# Patient Record
Sex: MALE | Race: WHITE
[De-identification: names, ages, dates, MRNs, and addresses within clinical notes are randomized per-mention and may not be internally consistent; named-entity substitution may affect disease eponyms.]

---

## 2017-01-02 ENCOUNTER — HOSPITAL ENCOUNTER (EMERGENCY)
Dept: HOSPITAL 47 - EC | Age: 47
LOS: 1 days | Discharge: HOME | End: 2017-01-03
Payer: COMMERCIAL

## 2017-01-02 VITALS — RESPIRATION RATE: 16 BRPM

## 2017-01-02 DIAGNOSIS — Z98.62: ICD-10-CM

## 2017-01-02 DIAGNOSIS — K42.9: ICD-10-CM

## 2017-01-02 DIAGNOSIS — N28.1: ICD-10-CM

## 2017-01-02 DIAGNOSIS — R10.9: Primary | ICD-10-CM

## 2017-01-02 DIAGNOSIS — Z79.899: ICD-10-CM

## 2017-01-02 DIAGNOSIS — N43.3: ICD-10-CM

## 2017-01-02 LAB
ALP SERPL-CCNC: 63 U/L (ref 38–126)
ALT SERPL-CCNC: 27 U/L (ref 21–72)
AMYLASE SERPL-CCNC: 72 U/L (ref 30–110)
ANION GAP SERPL CALC-SCNC: 14 MMOL/L
AST SERPL-CCNC: 20 U/L (ref 17–59)
BASOPHILS # BLD AUTO: 0.1 K/UL (ref 0–0.2)
BASOPHILS NFR BLD AUTO: 2 %
BUN SERPL-SCNC: 13 MG/DL (ref 9–20)
CALCIUM SPEC-MCNC: 9.3 MG/DL (ref 8.4–10.2)
CH: 29.2
CHCM: 34
CHLORIDE SERPL-SCNC: 101 MMOL/L (ref 98–107)
CO2 SERPL-SCNC: 29 MMOL/L (ref 22–30)
EOSINOPHIL # BLD AUTO: 0.2 K/UL (ref 0–0.7)
EOSINOPHIL NFR BLD AUTO: 3 %
ERYTHROCYTE [DISTWIDTH] IN BLOOD BY AUTOMATED COUNT: 5.29 M/UL (ref 4.3–5.9)
ERYTHROCYTE [DISTWIDTH] IN BLOOD: 13 % (ref 11.5–15.5)
GLUCOSE SERPL-MCNC: 98 MG/DL (ref 74–99)
HCT VFR BLD AUTO: 45.6 % (ref 39–53)
HDW: 2.76
HGB BLD-MCNC: 15.2 GM/DL (ref 13–17.5)
LUC NFR BLD AUTO: 2 %
LYMPHOCYTES # SPEC AUTO: 2 K/UL (ref 1–4.8)
LYMPHOCYTES NFR SPEC AUTO: 26 %
MCH RBC QN AUTO: 28.7 PG (ref 25–35)
MCHC RBC AUTO-ENTMCNC: 33.4 G/DL (ref 31–37)
MCV RBC AUTO: 86.2 FL (ref 80–100)
MONOCYTES # BLD AUTO: 0.5 K/UL (ref 0–1)
MONOCYTES NFR BLD AUTO: 6 %
NEUTROPHILS # BLD AUTO: 4.7 K/UL (ref 1.3–7.7)
NEUTROPHILS NFR BLD AUTO: 61 %
NON-AFRICAN AMERICAN GFR(MDRD): >60
PARTICLE COUNT: 1413
PH UR: 6 [PH] (ref 5–8)
POTASSIUM SERPL-SCNC: 4 MMOL/L (ref 3.5–5.1)
PROT SERPL-MCNC: 7.2 G/DL (ref 6.3–8.2)
RBC UR QL: 2 /HPF (ref 0–5)
SODIUM SERPL-SCNC: 144 MMOL/L (ref 137–145)
SP GR UR: 1.02 (ref 1–1.03)
SQUAMOUS UR QL AUTO: 2 /HPF (ref 0–4)
UA BILLING (MACRO VS. MICRO): (no result)
UROBILINOGEN UR QL STRIP: <2 MG/DL (ref ?–2)
WBC # BLD AUTO: 0.17 10*3/UL
WBC # BLD AUTO: 7.7 K/UL (ref 3.8–10.6)
WBC #/AREA URNS HPF: 8 /HPF (ref 0–5)
WBC (PEROX): 7.81

## 2017-01-02 PROCEDURE — 82150 ASSAY OF AMYLASE: CPT

## 2017-01-02 PROCEDURE — 74020: CPT

## 2017-01-02 PROCEDURE — 83605 ASSAY OF LACTIC ACID: CPT

## 2017-01-02 PROCEDURE — 87086 URINE CULTURE/COLONY COUNT: CPT

## 2017-01-02 PROCEDURE — 80053 COMPREHEN METABOLIC PANEL: CPT

## 2017-01-02 PROCEDURE — 36415 COLL VENOUS BLD VENIPUNCTURE: CPT

## 2017-01-02 PROCEDURE — 81001 URINALYSIS AUTO W/SCOPE: CPT

## 2017-01-02 PROCEDURE — 99284 EMERGENCY DEPT VISIT MOD MDM: CPT

## 2017-01-02 PROCEDURE — 74177 CT ABD & PELVIS W/CONTRAST: CPT

## 2017-01-02 PROCEDURE — 85025 COMPLETE CBC W/AUTO DIFF WBC: CPT

## 2017-01-02 PROCEDURE — 83690 ASSAY OF LIPASE: CPT

## 2017-01-02 NOTE — ED
General Adult HPI





- General


Source: patient, family, RN notes reviewed, old records reviewed


Mode of arrival: wheelchair


Limitations: no limitations





<Enrrique Bateman - Last Filed: 01/02/17 21:43>





<Simone Del Real - Last Filed: 01/02/17 23:07>





- General


Chief complaint: Urogenital


Stated complaint: Male 


Time Seen by Provider: 01/02/17 20:36





- History of Present Illness


Initial comments: 





Chief complaint history of present illness this is a 46-year-old male here with 

his significant other.  The patient's been having discomfort to the left flank 

left abdomen down into the left scrotal and testicular area.  He recently had 

ultrasounds.  The significant one is already detailed in the medical decision 

making area but does report bilateral hydroceles left being 8.6 cm with 

internal debris and a prominent right varicocele





On examination the patient exquisitely tender with even mild palpation of the 

left abdomen left flank.  Becomes diaphoretic.  Patient states he thinks he may 

have diverticulitis in the past. (Enrrique Bateman)





- Related Data


 Home Medications











 Medication  Instructions  Recorded  Confirmed


 


SUMAtriptan SUCCINATE [Imitrex] 25 mg PO BID PRN 12/03/15 01/02/17








 Previous Rx's











 Medication  Instructions  Recorded


 


Tamsulosin [Flomax] 0.4 mg PO DAILY #3 cap 07/25/16











 Allergies











Allergy/AdvReac Type Severity Reaction Status Date / Time


 


No Known Allergies Allergy   Verified 01/02/17 21:19














Review of Systems


ROS Other: All systems not noted in ROS Statement are negative.





<Enrrique Bateman - Last Filed: 01/02/17 21:43>


ROS Other: All systems not noted in ROS Statement are negative.





<Simone Del Real - Last Filed: 01/02/17 23:07>


ROS Statement: 


Those systems with pertinent positive or pertinent negative responses have been 

documented in the HPI.


Review of systems.  No headache or visual acuity changes.  He has on-again off-

again sweating because of the pain on the left side of the abdomen.  Nausea no 

vomiting.  Decreased appetite.  Has appointment see his general surgeon shortly 

but the pain prevented him from waiting that long.  All systems are reviewed.  

Past medical problems angina, pneumonia, kidney stones, diverticulosis, 

degenerative disc disease, urethral stricture.  The patient surgical back 

surgery, urethral stricture surgery cholecystectomy, heart catheterization 

butstents.  Hernia repair, left side.  And cardiac catheterization as noted 

above.  Family history kidney stones.  Patient has no ALLERGIES.  Nonsmoker 

nondrinker.





 (Enrrique Bateman)


 (Simone Del Real)





Past Medical History


Past Medical History: Chest Pain / Angina, Musculoskeletal Disorder, Pneumonia, 

Renal Disease


Additional Past Medical History / Comment(s): divericulosis, DDD, back pain and 

some neck pain on occasion, damage to uretha from catheter insertion during 

cystoscopy then developed urethral stricture- if pt notices urinary stream 

getting smaller he has a dilating device he uses but has not had to do this in 

yrs, kidney stones that he passed, carpal tunnel bilaterally, migraines, L 

fractured arm 2008. pancreatitis in may 2015


History of Any Multi-Drug Resistant Organisms: None Reported


Past Surgical History: Back Surgery, Cholecystectomy, Heart Catheterization, 

Hernia Repair, Orthopedic Surgery


Additional Past Surgical History / Comment(s): Cardiac cath normal,  cystoscopy

, R and L inguinal hernia repairs.  C5-6 discectomy with fusion and  titanium 

plate, L shoulder rotaor cuff repair acromioplasty and distal clavicle removed.

  Numerous colonoscopies,


Past Anesthesia/Blood Transfusion Reactions: No Reported Reaction


Additional Past Anesthesia/Blood Transfusion Reaction / Comment(s): Pt has 

never recieved blood.


Past Psychological History: No Psychological Hx Reported


Additional Psychological History / Comment(s): Pt lives at home with 

significant other.  He is independent. No assistive devices.  No home care.


Smoking Status: Never smoker


Past Alcohol Use History: None Reported


Additional Past Alcohol Use History / Comment(s): smoked as teenager in high 

school none since.


Past Drug Use History: None Reported





- Past Family History


  ** Father


Family Medical History: Diabetes Mellitus, Hypertension





  ** Mother


Family Medical History: Cancer


Additional Family Medical History / Comment(s): Mother had a total hysterectomy 

due to CA





<Enrrique Bateman - Last Filed: 01/02/17 21:43>





General Exam


Limitations: no limitations





<Enrrique Bateman - Last Filed: 01/02/17 21:43>


General appearance: alert, in no apparent distress


Head exam: Present: atraumatic, normocephalic, normal inspection


Eye exam: Present: normal appearance, PERRL, EOMI.  Absent: scleral icterus, 

conjunctival injection, periorbital swelling


ENT exam: Present: normal exam, mucous membranes moist


Neck exam: Present: normal inspection.  Absent: tenderness, meningismus, 

lymphadenopathy


Respiratory exam: Present: normal lung sounds bilaterally.  Absent: respiratory 

distress, wheezes, rales, rhonchi, stridor


Cardiovascular Exam: Present: regular rate, normal rhythm, normal heart sounds.

  Absent: systolic murmur, diastolic murmur, rubs, gallop, clicks


GI/Abdominal exam: Present: soft, normal bowel sounds.  Absent: distended, 

tenderness, guarding, rebound, rigid


Extremities exam: Present: normal inspection, full ROM, normal capillary 

refill.  Absent: tenderness, pedal edema, joint swelling, calf tenderness


Back exam: Present: normal inspection


Neurological exam: Present: alert, oriented X3, CN II-XII intact


Psychiatric exam: Present: normal affect, normal mood


Skin exam: Present: warm, dry, intact, normal color.  Absent: rash





<Simone Del Real - Last Filed: 01/02/17 23:07>





- General Exam Comments


Initial Comments: 





General:


The patient is awake and alert, because diaphoretic with palpation of the left 

side of the abdomen.  Ongoing for several days.  Vital signs temp 97.0 pulse 90 

respiratory rate 18 pulse ox 99% room air


Eye:


Pupils are equal,  , extra-ocular movements are intact; there is normal 

conjunctiva bilaterally. No signs of icterus. 


Ears, nose, mouth and throat:


There are moist mucous membranes  


Neck:


The neck is supple, there is no tenderness .. 


Cardiovascular:


There is a regular rate and rhythm. No murmur, rub or gallop is appreciated.


Respiratory:


Lungs are clear to auscultation, respirations are non-labored, breath sounds 

are equal. No wheezes, stridor, rales, or rhonchi.


Gastrointestinal:


The patient has voluntary guarding and becomes diaphoretic with even mild 

palpation of the left side the abdomen.  Deep palpation on the right side 

causes no pain minimal to no rebound or referred pain to the left side.  

Examination of the scrotum finds significant hydroceles bilaterally currently 

the left is greater than the right.  Tenderness with manipulation even 

significant was a tenderness with even mild palpation over the left inguinal 

region.


Back:


There is no tenderness to palpation in the midline. There is no obvious 

deformity. No rashes noted. 


Musculoskeletal:


Normal ROM, no tenderness, There is no pedal edema. There is no calf tenderness 

or swelling. Sensation intact. Pulses equal bilaterally 2+. 


Neurological:


No neuro deficits


Skin:


Skin is warm and dry and no rashes or lesions are noted. 


  (Enrrique Bateman)





Course





<Enrrique Bateman - Last Filed: 01/02/17 21:43>





<Simone Del Real - Last Filed: 01/02/17 23:07>





 Vital Signs











  01/02/17 01/02/17





  20:15 22:47


 


Temperature 98.0 F 


 


Pulse Rate 97 64


 


Respiratory 18 16





Rate  


 


Blood Pressure  114/64


 


O2 Sat by Pulse 99 99





Oximetry  








 (Enrrique Bateman)


 (Simone Del Real)





- Reevaluation(s)


Reevaluation #1: 





01/02/17 23:06


Patient's symptoms at this time are improved, no specific abdominal pain (

Simone Del Real)


Reevaluation #2: 





01/02/17 23:06


Patient counseled regarding CT findings, hernia, will follow up with surgery as 

appropriate (Simone Del Real)





Medical Decision Making





- Lab Data


Result diagrams: 


 01/02/17 21:10





 01/02/17 21:10





<Enrrique Bateman - Last Filed: 01/02/17 21:43>





- Lab Data


Result diagrams: 


 01/02/17 21:10





 01/02/17 21:10





- Radiology Data


Radiology results: report reviewed (X-ray abdomen, CT and pelvis does show fat-

containing hernia, no acute disease), image reviewed





<Simone Del Real - Last Filed: 01/02/17 23:07>





- Medical Decision Making





Patient had several ultrasounds and tests prior to coming in today.  All 

related to the discomfort in the left side and left testicle area.  The patient'

s ultrasound performed 12 days ago showed good bilateral color flow and 

waveforms are seen is no evidence of testicular torsion.  There are presence of 

hydroceles on the right side a 6 cm with internal debris on the left 8.6 cm 

with internal debris.  There is also presence of varicoceles on the right with 

prominent vessels.  The right testicle increase in vascularity with Valsalva.  

As read by Dr. Kellogg





the patient also had an ultrasound of the abdomen complete in the pelvis 

limited.  The radiologist's impression is the pancreas poorly visualized.  The 

liver visualized portions are within normal limits limited by rib shadowing.  

The gallbladder is surgically absent.  Common bile duct visualized portions 

normal limits, limited distally overlying


Gas.  The spleen visualized portions are normal limits, limited by rib 

shadowing overlying gas.  The right kidney 1.6 cm hypoechoic area superior pole

, 0.5 cm echogenic shadowing focus superior pole.  There is 1.6 cm simple 

appearing cyst in the upper pole right kidney.  There is very questionable 4.6 

mm calcification right hip.  Left kidney within normal limits.  Aorta 

visualized portions normal limits proximal


Midline gas.  Upper IVC, within normal limits.  The bladder wall appears 

slightly thickened 0.4 cm.  Bladder is not distended however final impression 

is 1 simple appearing right upper pole renal cyst.  No acute questionable 5 mm 

calcification in the right.  As read by Dr. Kellogg ([facility])





- Lab Data





 Lab Results











  01/02/17 01/02/17 01/02/17 Range/Units





  21:10 21:10 21:10 


 


WBC   7.7   (3.8-10.6)  k/uL


 


RBC   5.29   (4.30-5.90)  m/uL


 


Hgb   15.2   (13.0-17.5)  gm/dL


 


Hct   45.6   (39.0-53.0)  %


 


MCV   86.2   (80.0-100.0)  fL


 


MCH   28.7   (25.0-35.0)  pg


 


MCHC   33.4   (31.0-37.0)  g/dL


 


RDW   13.0   (11.5-15.5)  %


 


Plt Count   252   (150-450)  k/uL


 


Neutrophils %   61   %


 


Lymphocytes %   26   %


 


Monocytes %   6   %


 


Eosinophils %   3   %


 


Basophils %   2   %


 


Neutrophils #   4.7   (1.3-7.7)  k/uL


 


Lymphocytes #   2.0   (1.0-4.8)  k/uL


 


Monocytes #   0.5   (0-1.0)  k/uL


 


Eosinophils #   0.2   (0-0.7)  k/uL


 


Basophils #   0.1   (0-0.2)  k/uL


 


Sodium  144    (137-145)  mmol/L


 


Potassium  4.0    (3.5-5.1)  mmol/L


 


Chloride  101    ()  mmol/L


 


Carbon Dioxide  29    (22-30)  mmol/L


 


Anion Gap  14    mmol/L


 


BUN  13    (9-20)  mg/dL


 


Creatinine  0.86    (0.66-1.25)  mg/dL


 


Est GFR (MDRD) Af Amer  >60    (>60 ml/min/1.73 sqM)  


 


Est GFR (MDRD) Non-Af  >60    (>60 ml/min/1.73 sqM)  


 


Glucose  98    (74-99)  mg/dL


 


Plasma Lactic Acid Jones    2.0  (0.7-2.0)  mmol/L


 


Calcium  9.3    (8.4-10.2)  mg/dL


 


Total Bilirubin  0.5    (0.2-1.3)  mg/dL


 


AST  20    (17-59)  U/L


 


ALT  27    (21-72)  U/L


 


Alkaline Phosphatase  63    ()  U/L


 


Total Protein  7.2    (6.3-8.2)  g/dL


 


Albumin  4.1    (3.5-5.0)  g/dL


 


Amylase  72    ()  U/L


 


Lipase  192    ()  U/L


 


Urine Color     


 


Urine Appearance     (Clear)  


 


Urine pH     (5.0-8.0)  


 


Ur Specific Gravity     (1.001-1.035)  


 


Urine Protein     (Negative)  


 


Urine Glucose (UA)     (Negative)  


 


Urine Ketones     (Negative)  


 


Urine Blood     (Negative)  


 


Urine Nitrate     (Negative)  


 


Urine Bilirubin     (Negative)  


 


Urine Urobilinogen     (<2.0)  mg/dL


 


Ur Leukocyte Esterase     (Negative)  


 


Urine RBC     (0-5)  /hpf


 


Urine WBC     (0-5)  /hpf


 


Ur Squamous Epith Cells     (0-4)  /hpf


 


Calcium Oxalate Crystal     (None)  /hpf


 


Urine Bacteria     (None)  /hpf


 


Urine Mucus     (None)  /hpf














  01/02/17 Range/Units





  21:20 


 


WBC   (3.8-10.6)  k/uL


 


RBC   (4.30-5.90)  m/uL


 


Hgb   (13.0-17.5)  gm/dL


 


Hct   (39.0-53.0)  %


 


MCV   (80.0-100.0)  fL


 


MCH   (25.0-35.0)  pg


 


MCHC   (31.0-37.0)  g/dL


 


RDW   (11.5-15.5)  %


 


Plt Count   (150-450)  k/uL


 


Neutrophils %   %


 


Lymphocytes %   %


 


Monocytes %   %


 


Eosinophils %   %


 


Basophils %   %


 


Neutrophils #   (1.3-7.7)  k/uL


 


Lymphocytes #   (1.0-4.8)  k/uL


 


Monocytes #   (0-1.0)  k/uL


 


Eosinophils #   (0-0.7)  k/uL


 


Basophils #   (0-0.2)  k/uL


 


Sodium   (137-145)  mmol/L


 


Potassium   (3.5-5.1)  mmol/L


 


Chloride   ()  mmol/L


 


Carbon Dioxide   (22-30)  mmol/L


 


Anion Gap   mmol/L


 


BUN   (9-20)  mg/dL


 


Creatinine   (0.66-1.25)  mg/dL


 


Est GFR (MDRD) Af Amer   (>60 ml/min/1.73 sqM)  


 


Est GFR (MDRD) Non-Af   (>60 ml/min/1.73 sqM)  


 


Glucose   (74-99)  mg/dL


 


Plasma Lactic Acid Jones   (0.7-2.0)  mmol/L


 


Calcium   (8.4-10.2)  mg/dL


 


Total Bilirubin   (0.2-1.3)  mg/dL


 


AST   (17-59)  U/L


 


ALT   (21-72)  U/L


 


Alkaline Phosphatase   ()  U/L


 


Total Protein   (6.3-8.2)  g/dL


 


Albumin   (3.5-5.0)  g/dL


 


Amylase   ()  U/L


 


Lipase   ()  U/L


 


Urine Color  Yellow  


 


Urine Appearance  Clear  (Clear)  


 


Urine pH  6.0  (5.0-8.0)  


 


Ur Specific Gravity  1.017  (1.001-1.035)  


 


Urine Protein  Trace H  (Negative)  


 


Urine Glucose (UA)  Negative  (Negative)  


 


Urine Ketones  Negative  (Negative)  


 


Urine Blood  Negative  (Negative)  


 


Urine Nitrate  Negative  (Negative)  


 


Urine Bilirubin  Negative  (Negative)  


 


Urine Urobilinogen  <2.0  (<2.0)  mg/dL


 


Ur Leukocyte Esterase  Trace H  (Negative)  


 


Urine RBC  2  (0-5)  /hpf


 


Urine WBC  8 H  (0-5)  /hpf


 


Ur Squamous Epith Cells  2  (0-4)  /hpf


 


Calcium Oxalate Crystal  Rare H  (None)  /hpf


 


Urine Bacteria  Rare H  (None)  /hpf


 


Urine Mucus  Rare H  (None)  /hpf








 (Enrrique Bateman)


 (Simone Del Real)





Disposition





<Enrriqeu Bateman - Last Filed: 01/02/17 21:43>





<Simone Del Real - Last Filed: 01/02/17 23:07>


Clinical Impression: 


 Abdominal pain, Umbilical hernia





Disposition: HOME SELF-CARE


Condition: Good


Instructions:  Abdominal Pain (ED), Umbilical Hernia (ED)


Referrals: 


AMAYA Newton III, MD [Primary Care Provider] - 1-2 days


Hilda Shaw MD [STAFF PHYSICIAN] - 1-2 days

## 2017-01-02 NOTE — CT
EXAMINATION TYPE: CT abdomen pelvis w con

 

DATE OF EXAM: 1/2/2017 10:36 PM

 

COMPARISON: 7/25/2016

 

HISTORY: Generalized abdominal pain and left testicular pain with discoloration to the scrotum, histo
ry of diverticulitis, and cholecystectomy

 

CT DLP: 2496.60 mGycm

Automated exposure control for dose reduction was used.

 

TECHNIQUE:  Helical acquisition of images was performed from the lung bases through the pelvis.

 

CONTRAST: 

Performed without Oral Contrast and with IV Contrast, patient injected with 100 mL of Omnipaque 300.

 

FINDINGS: 

 

LUNG BASES: Mild atelectasis/scarring is suggested in the right lung base anteriorly.

 

LIVER/GB: No significant abnormality is appreciated in the liver. Cholecystectomy changes are noted.

 

PANCREAS: No significant abnormality is seen.

 

SPLEEN: No significant abnormality is seen.

 

ADRENALS: No significant abnormality is seen.

 

KIDNEYS: Benign-appearing bilateral renal cysts are noted.

Mild perinephric fat stranding is noted bilaterally more on the left side probably of chronic nature.
 There is suggestion of tiny nonobstructing opaque stones of 2 to 3 mm in both kidneys.

 

REPRODUCTIVE ORGANS: Mild enlargement of prostate gland is noted.

 

URINARY BLADDER:  No significant abnormality is seen.

 

PELVIC ADENOPATHY:  None visualized.

 

OSSEOUS STRUCTURES:  Multilevel degenerative changes are present in the thoracolumbar spine with mult
iple old wedge compression deformities of thoracic vertebrae.

 

BOWEL:  Visualized appendix appears grossly unremarkable without significant surrounding inflammation
.

Stomach appears grossly unremarkable. Mild fluid distention of small bowel loops are noted without si
gnificant obstruction.

Mild colonic diverticulosis is noted. There is mucosal thickening in the sigmoid colon and descending
 colon and possibility of mild colitis changes cannot be excluded.

 

ABDOMINAL WALL: There is evidence of moderate fact and probable omentum containing inguinal hernias b
ilaterally larger on the left side. No bowel herniation is noted.

Visualized left testicle appears grossly unremarkable in this limited evaluation. Only a small portio
n of left testicle is included in the axial images. The right testicle is not included in the axial i
mages.

 

IMPRESSION: 

 

1. MODERATE FACT AND PROBABLE OMENTUM CONTAINING INGUINAL HERNIAS BILATERALLY LARGER ON THE LEFT SIDE
.

2. VISUALIZED LEFT TESTICLE APPEARS GROSSLY UNREMARKABLE IN THIS LIMITED EVALUATION.

3. BILATERAL RENAL CYSTS AND NONOBSTRUCTING OPAQUE STONES.

4. CHOLECYSTECTOMY. 

5. VISUALIZED APPENDIX APPEARS UNREMARKABLE.

## 2017-01-03 VITALS — HEART RATE: 67 BPM | SYSTOLIC BLOOD PRESSURE: 107 MMHG | TEMPERATURE: 97.9 F | DIASTOLIC BLOOD PRESSURE: 78 MMHG

## 2017-04-03 ENCOUNTER — HOSPITAL ENCOUNTER (EMERGENCY)
Dept: HOSPITAL 47 - EC | Age: 47
Discharge: HOME | End: 2017-04-03
Payer: COMMERCIAL

## 2017-04-03 VITALS
RESPIRATION RATE: 20 BRPM | HEART RATE: 95 BPM | DIASTOLIC BLOOD PRESSURE: 66 MMHG | TEMPERATURE: 97.9 F | SYSTOLIC BLOOD PRESSURE: 115 MMHG

## 2017-04-03 DIAGNOSIS — S43.004A: Primary | ICD-10-CM
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
## 2017-04-03 PROCEDURE — 23650 CLTX SHO DSLC W/MNPJ WO ANES: CPT

## 2017-04-03 PROCEDURE — 73020 X-RAY EXAM OF SHOULDER: CPT

## 2017-04-03 PROCEDURE — 99284 EMERGENCY DEPT VISIT MOD MDM: CPT

## 2017-04-03 PROCEDURE — 96374 THER/PROPH/DIAG INJ IV PUSH: CPT

## 2017-04-03 PROCEDURE — 99152 MOD SED SAME PHYS/QHP 5/>YRS: CPT

## 2017-04-03 NOTE — XR
EXAMINATION TYPE: XR shoulder limited RT

 

DATE OF EXAM: 4/3/2017 6:19 PM

 

COMPARISON: NONE

 

HISTORY: Fell today

 

TECHNIQUE: 2 views

 

FINDINGS: There is anterior dislocation of the humeral head. Exam is limited by the obesity. I see no
 fracture line.

 

IMPRESSION: Anterior shoulder joint dislocation.

## 2017-04-03 NOTE — XR
EXAMINATION TYPE: XR shoulder limited RT

 

DATE OF EXAM: 4/3/2017 6:55 PM

 

COMPARISON: Today

 

HISTORY: Post reduction

 

TECHNIQUE: Single view

 

FINDINGS: There appears to be anatomic reduction of the glenohumeral joint. I see no fracture line.

 

IMPRESSION: Anatomic reduction.

## 2017-04-03 NOTE — ED
General Adult HPI





- General


Chief complaint: Extremity Injury, Upper


Stated complaint: FALL


Time Seen by Provider: 04/03/17 17:45


Source: patient, EMS, RN notes reviewed, old records reviewed


Mode of arrival: EMS


Limitations: no limitations





- History of Present Illness


Initial comments: 





This is a 46-year-old male to the ER for evaluation.  This patient closely for 

evaluation of shoulder pain.  Patient has been falling and right shoulder, 

severe right shoulder pain.  Patient's fall was mechanical in nature has no 

other injury aside from the right shoulder





- Related Data


 Home Medications











 Medication  Instructions  Recorded  Confirmed


 


SUMAtriptan SUCCINATE [Imitrex] 25 mg PO BID PRN 12/03/15 01/02/17








 Previous Rx's











 Medication  Instructions  Recorded


 


Tamsulosin [Flomax] 0.4 mg PO DAILY #3 cap 07/25/16











 Allergies











Allergy/AdvReac Type Severity Reaction Status Date / Time


 


No Known Allergies Allergy   Verified 04/03/17 17:48














Review of Systems


ROS Statement: 


Those systems with pertinent positive or pertinent negative responses have been 

documented in the HPI.





ROS Other: All systems not noted in ROS Statement are negative.





Past Medical History


Past Medical History: Chest Pain / Angina, Musculoskeletal Disorder, Pneumonia, 

Renal Disease


Additional Past Medical History / Comment(s): divericulosis, DDD, back pain and 

some neck pain on occasion, damage to uretha from catheter insertion during 

cystoscopy then developed urethral stricture- if pt notices urinary stream 

getting smaller he has a dilating device he uses but has not had to do this in 

yrs, kidney stones that he passed, carpal tunnel bilaterally, migraines, L 

fractured arm 2008. pancreatitis in may 2015


History of Any Multi-Drug Resistant Organisms: None Reported


Past Surgical History: Back Surgery, Cholecystectomy, Heart Catheterization, 

Hernia Repair, Orthopedic Surgery


Additional Past Surgical History / Comment(s): Cardiac cath normal,  cystoscopy

, R and L inguinal hernia repairs.  C5-6 discectomy with fusion and  titanium 

plate, L shoulder rotaor cuff repair acromioplasty and distal clavicle removed.

  Numerous colonoscopies,


Past Anesthesia/Blood Transfusion Reactions: No Reported Reaction


Additional Past Anesthesia/Blood Transfusion Reaction / Comment(s): Pt has 

never recieved blood.


Past Psychological History: No Psychological Hx Reported


Additional Psychological History / Comment(s): Pt lives at home with 

significant other.  He is independent. No assistive devices.  No home care.


Smoking Status: Never smoker


Past Alcohol Use History: None Reported


Additional Past Alcohol Use History / Comment(s): smoked as teenager in high 

school none since.


Past Drug Use History: None Reported





- Past Family History


  ** Father


Family Medical History: Diabetes Mellitus, Hypertension





  ** Mother


Family Medical History: Cancer


Additional Family Medical History / Comment(s): Mother had a total hysterectomy 

due to CA





General Exam


Limitations: no limitations


General appearance: alert, in no apparent distress


Head exam: Present: atraumatic, normocephalic, normal inspection


Eye exam: Present: normal appearance, PERRL, EOMI.  Absent: scleral icterus, 

conjunctival injection, periorbital swelling


ENT exam: Present: normal exam, mucous membranes moist


Neck exam: Present: normal inspection.  Absent: tenderness, meningismus, 

lymphadenopathy


Respiratory exam: Present: normal lung sounds bilaterally.  Absent: respiratory 

distress, wheezes, rales, rhonchi, stridor


Cardiovascular Exam: Present: regular rate, normal rhythm, normal heart sounds.

  Absent: systolic murmur, diastolic murmur, rubs, gallop, clicks


GI/Abdominal exam: Present: soft, normal bowel sounds.  Absent: distended, 

tenderness, guarding, rebound, rigid


Extremities exam: Present: normal inspection, full ROM, normal capillary 

refill.  Absent: tenderness, pedal edema, joint swelling, calf tenderness


Back exam: Present: normal inspection


Neurological exam: Present: alert, oriented X3, CN II-XII intact


Psychiatric exam: Present: normal affect, normal mood


Skin exam: Present: warm, dry, intact, normal color.  Absent: rash





Course


 Vital Signs











  04/03/17 04/03/17 04/03/17





  17:48 18:38 18:43


 


Temperature 97.6 F  


 


Pulse Rate 60 74 72


 


Respiratory 18 16 16





Rate   


 


Blood Pressure 105/62 104/59 104/59


 


O2 Sat by Pulse 100 98 100





Oximetry   














  04/03/17





  18:45


 


Temperature 


 


Pulse Rate 72


 


Respiratory 15





Rate 


 


Blood Pressure 158/72


 


O2 Sat by Pulse 100





Oximetry 














- Reevaluation(s)


Reevaluation #1: 





04/03/17 18:55


After procedural sedation patient has not returned to being completely awake 

and alert





Procedures





- Orthopedic Joint Reduction


  ** Joint #1


Consent Obtained: verbal consent


Time Out Performed: Yes


Side: right


Joint Reduction Location: shoulder


Analgesia: procedural sedation


Shoulder Technique Used (if applicable): traction/counter-traction, external 

rotation


Post-Reduction Neuro Exam: intact


Post-Reduction Vascular Exam: intact


Post Reduction X-Ray Obtained: Yes


Post Reduction X-Ray Results: reduced


Splint Applied: Yes


Patient Tolerated Procedure: well





- Procedural Sedation


Indications: fracture/dislocation reduction


ASA Class: II


Mallampati Airway Score: 2


Preparation: cardiac monitor applied, pulse oximeter, capnometry used, 

supplemental O2 applied, reversal agents at bedside, suction/airway equipment 

at bedside


Midazolam: IV


Midazolam Dose: 2


Ketamine: IV


Ketamine Dose: 100


Complications: none


Interventions: oxygen applied


Patient Tolerated Procedure: well





Medical Decision Making





- Medical Decision Making





46 year status post fall, positive right shoulder dislocation, patient was 

placed in the conscious sedation here in the emergency room, her shoulder was 

relocated and patient was discharged home





- Radiology Data


Radiology results: report reviewed (X-ray right shoulder shows anterior 

dislocation, repeat x-ray shows relocation of reduced right shoulder joint), 

image reviewed





Disposition


Clinical Impression: 


 Dislocation of shoulder region, Dislocation of right shoulder joint, Fall





Disposition: HOME SELF-CARE


Condition: Good


Referrals: 


AMAYA Newton III, MD [Primary Care Provider] - 1-2 days

## 2017-05-24 ENCOUNTER — HOSPITAL ENCOUNTER (OUTPATIENT)
Dept: HOSPITAL 47 - RADMRIMAIN | Age: 47
Discharge: HOME | End: 2017-05-24
Payer: COMMERCIAL

## 2017-05-24 DIAGNOSIS — M25.411: Primary | ICD-10-CM

## 2017-05-24 NOTE — MR
EXAMINATION TYPE: MR shoulder RT wo con

 

DATE OF EXAM: 5/24/2017 12:21 PM

 

COMPARISON: NONE

 

HISTORY: Pain in Right shoulder, Dislocation

 

TECHNIQUE: Multiplanar, multisequence imaging of the right shoulder is performed without contrast. Th
e patient was claustrophobic and could not complete the exam. No sagittal images are provided.

 

FINDINGS: Limited scanning demonstrates increased fluid surrounding the bicipital tendon which appear
s dislocated from the bicipital groove.

 

There is a small joint effusion. A complete tear of the rotator cuff tendon with retraction to the AC
 joint is suspected. Hypertrophic change of the AC joint is noted.

 

Due to motion and lack of complete imaging evaluation the labrum is suboptimal. Evaluation the inferi
or glenohumeral ligament is nondiagnostic. Could not exclude injury.

 

 

IMPRESSION: 

Severely limited exam due motion artifact and lack of sagittal images. The exam is incomplete seconda
ry to claustrophobia. Complete tear of the rotator cuff  is suspected with elevation of the humeral h
ead and joint effusion and tendinous retraction.

 

Bicipital tendon appears to be dislocated from the bicipital groove. Incompletely imaged cannot exclu
de biceps bicipital tendon tear based on the limited images provided.

 

AC joint arthropathy.

## 2017-06-03 ENCOUNTER — HOSPITAL ENCOUNTER (EMERGENCY)
Dept: HOSPITAL 47 - EC | Age: 47
End: 2017-06-03
Payer: COMMERCIAL

## 2017-06-03 VITALS — TEMPERATURE: 99.1 F | DIASTOLIC BLOOD PRESSURE: 67 MMHG | HEART RATE: 78 BPM | SYSTOLIC BLOOD PRESSURE: 103 MMHG

## 2017-06-03 VITALS — RESPIRATION RATE: 16 BRPM

## 2017-06-03 DIAGNOSIS — J18.9: Primary | ICD-10-CM

## 2017-06-03 DIAGNOSIS — R11.2: ICD-10-CM

## 2017-06-03 DIAGNOSIS — R19.7: ICD-10-CM

## 2017-06-03 DIAGNOSIS — Z87.891: ICD-10-CM

## 2017-06-03 LAB
ALP SERPL-CCNC: 50 U/L (ref 38–126)
ALT SERPL-CCNC: 63 U/L (ref 21–72)
ANION GAP SERPL CALC-SCNC: 12 MMOL/L
AST SERPL-CCNC: 221 U/L (ref 17–59)
BASOPHILS # BLD AUTO: 0.1 K/UL (ref 0–0.2)
BASOPHILS NFR BLD AUTO: 1 %
BUN SERPL-SCNC: 40 MG/DL (ref 9–20)
CALCIUM SPEC-MCNC: 8.4 MG/DL (ref 8.4–10.2)
CH: 29
CHCM: 35.7
CHLORIDE SERPL-SCNC: 95 MMOL/L (ref 98–107)
CO2 SERPL-SCNC: 25 MMOL/L (ref 22–30)
EOSINOPHIL # BLD AUTO: 0 K/UL (ref 0–0.7)
EOSINOPHIL NFR BLD AUTO: 0 %
ERYTHROCYTE [DISTWIDTH] IN BLOOD BY AUTOMATED COUNT: 5.11 M/UL (ref 4.3–5.9)
ERYTHROCYTE [DISTWIDTH] IN BLOOD: 13.8 % (ref 11.5–15.5)
GLUCOSE SERPL-MCNC: 96 MG/DL (ref 74–99)
HCT VFR BLD AUTO: 41.7 % (ref 39–53)
HDW: 2.98
HGB BLD-MCNC: 14.5 GM/DL (ref 13–17.5)
LUC NFR BLD AUTO: 3 %
LYMPHOCYTES # SPEC AUTO: 0.8 K/UL (ref 1–4.8)
LYMPHOCYTES NFR SPEC AUTO: 11 %
MAGNESIUM SPEC-SCNC: 2.7 MG/DL (ref 1.6–2.3)
MCH RBC QN AUTO: 28.5 PG (ref 25–35)
MCHC RBC AUTO-ENTMCNC: 34.9 G/DL (ref 31–37)
MCV RBC AUTO: 81.6 FL (ref 80–100)
MONOCYTES # BLD AUTO: 0.3 K/UL (ref 0–1)
MONOCYTES NFR BLD AUTO: 5 %
NEUTROPHILS # BLD AUTO: 5.7 K/UL (ref 1.3–7.7)
NEUTROPHILS NFR BLD AUTO: 81 %
NON-AFRICAN AMERICAN GFR(MDRD): 53
PHOSPHATE SERPL-MCNC: 4 MG/DL (ref 2.5–4.5)
POTASSIUM SERPL-SCNC: 3.3 MMOL/L (ref 3.5–5.1)
PROT SERPL-MCNC: 6.7 G/DL (ref 6.3–8.2)
SODIUM SERPL-SCNC: 132 MMOL/L (ref 137–145)
TROPONIN I SERPL-MCNC: 0.02 NG/ML (ref 0–0.03)
WBC # BLD AUTO: 0.2 10*3/UL
WBC # BLD AUTO: 7 K/UL (ref 3.8–10.6)
WBC (PEROX): 6.68

## 2017-06-03 PROCEDURE — 99284 EMERGENCY DEPT VISIT MOD MDM: CPT

## 2017-06-03 PROCEDURE — 74022 RADEX COMPL AQT ABD SERIES: CPT

## 2017-06-03 PROCEDURE — 84100 ASSAY OF PHOSPHORUS: CPT

## 2017-06-03 PROCEDURE — 36415 COLL VENOUS BLD VENIPUNCTURE: CPT

## 2017-06-03 PROCEDURE — 93005 ELECTROCARDIOGRAM TRACING: CPT

## 2017-06-03 PROCEDURE — 85025 COMPLETE CBC W/AUTO DIFF WBC: CPT

## 2017-06-03 PROCEDURE — 82553 CREATINE MB FRACTION: CPT

## 2017-06-03 PROCEDURE — 83735 ASSAY OF MAGNESIUM: CPT

## 2017-06-03 PROCEDURE — 80053 COMPREHEN METABOLIC PANEL: CPT

## 2017-06-03 PROCEDURE — 84484 ASSAY OF TROPONIN QUANT: CPT

## 2017-06-03 PROCEDURE — 96361 HYDRATE IV INFUSION ADD-ON: CPT

## 2017-06-03 PROCEDURE — 96375 TX/PRO/DX INJ NEW DRUG ADDON: CPT

## 2017-06-03 PROCEDURE — 96374 THER/PROPH/DIAG INJ IV PUSH: CPT

## 2017-06-03 PROCEDURE — 83690 ASSAY OF LIPASE: CPT

## 2017-06-03 PROCEDURE — 82550 ASSAY OF CK (CPK): CPT

## 2017-06-03 NOTE — XR
EXAMINATION TYPE: XR abdomen acute w cxr

 

DATE OF EXAM: 6/3/2017

 

COMPARISON: NONE

 

HISTORY: Pain

 

TECHNIQUE: Single view of the chest and 2 views of the abdomen are submitted. 

 

FINDINGS:  

Single view of the chest demonstrates right lower lobe airspace consolidation. 

 

There is no evidence for pneumoperitoneum.  

 

The bowel gas pattern is unremarkable as there is air throughout nondilated small and large bowel.  

 

No sizeable air fluid levels.No mass effects are seen.  

 

No unusual calcifications.  

 

IMPRESSION: 

 

1.  Right lower lobe infiltrate. Correlate for pneumonia.

2. Unremarkable abdomen. Cholecystectomy clips.

## 2017-06-03 NOTE — ED
General Adult HPI





- General


Chief complaint: Nausea/Vomiting/Diarrhea


Stated complaint: NVD *6days


Time Seen by Provider: 06/03/17 07:21


Source: patient, RN notes reviewed, old records reviewed


Mode of arrival: ambulatory


Limitations: no limitations





- History of Present Illness


Initial comments: 





A 39-year-old male the ER for evaluation of multiple complaints, complaints 

vomiting and cold symptoms, cough congestion diarrhea abdominal pain.  Nausea 

and vomiting.  Medical history is significant for multiple surgeries and, no 

history of heart disease.  Nonsmoker.  Patient states family members have also 

been getting sick that he has got at the worse.  No change in medications.  No 

travel history.  No recent hospitalizations or sick contacts.  Patient's is 

states he is having decreased appetite, decreased activity level, beginning to 

feel more weak and fatigued.





- Related Data


 Previous Rx's











 Medication  Instructions  Recorded


 


Albuterol Inhaler [Ventolin Hfa 1 - 2 puff INHALATION Q6HR #1 06/03/17





Inhaler] inhaler 


 


Levofloxacin [Levaquin] 750 mg PO DAILY #7 tab 06/03/17


 


Naproxen [Naprosyn] 500 mg PO Q12HR #30 tab 06/03/17


 


Ondansetron [Zofran] 4 mg PO Q8HR PRN #30 tab 06/03/17











 Allergies











Allergy/AdvReac Type Severity Reaction Status Date / Time


 


No Known Allergies Allergy   Verified 06/03/17 06:54














Review of Systems


ROS Statement: 


Those systems with pertinent positive or pertinent negative responses have been 

documented in the HPI.





ROS Other: All systems not noted in ROS Statement are negative.





Past Medical History


Past Medical History: Chest Pain / Angina, Musculoskeletal Disorder, Pneumonia, 

Renal Disease


Additional Past Medical History / Comment(s): divericulosis, DDD, back pain and 

some neck pain on occasion, damage to uretha from catheter insertion during 

cystoscopy then developed urethral stricture- if pt notices urinary stream 

getting smaller he has a dilating device he uses but has not had to do this in 

yrs, kidney stones that he passed, carpal tunnel bilaterally, migraines, L 

fractured arm 2008. pancreatitis in may 2015


History of Any Multi-Drug Resistant Organisms: None Reported


Past Surgical History: Back Surgery, Cholecystectomy, Heart Catheterization, 

Hernia Repair, Orthopedic Surgery


Additional Past Surgical History / Comment(s): Cardiac cath normal,  cystoscopy

, R and L inguinal hernia repairs.  C5-6 discectomy with fusion and  titanium 

plate, L shoulder rotaor cuff repair acromioplasty and distal clavicle removed.

  Numerous colonoscopies,


Past Anesthesia/Blood Transfusion Reactions: No Reported Reaction


Additional Past Anesthesia/Blood Transfusion Reaction / Comment(s): Pt has 

never recieved blood.


Past Psychological History: No Psychological Hx Reported


Additional Psychological History / Comment(s): Pt lives at home with 

significant other.  He is independent. No assistive devices.  No home care.


Smoking Status: Never smoker


Past Alcohol Use History: None Reported


Additional Past Alcohol Use History / Comment(s): smoked as teenager in high 

school none since.


Past Drug Use History: None Reported





- Past Family History


  ** Father


Family Medical History: Diabetes Mellitus, Hypertension





  ** Mother


Family Medical History: Cancer


Additional Family Medical History / Comment(s): Mother had a total hysterectomy 

due to CA





General Exam


Limitations: no limitations


General appearance: alert, in no apparent distress


Head exam: Present: atraumatic, normocephalic, normal inspection


Eye exam: Present: normal appearance, PERRL, EOMI.  Absent: scleral icterus, 

conjunctival injection, periorbital swelling


ENT exam: Present: normal exam, mucous membranes moist


Neck exam: Present: normal inspection.  Absent: tenderness, meningismus, 

lymphadenopathy


Respiratory exam: Present: normal lung sounds bilaterally.  Absent: respiratory 

distress, wheezes, rales, rhonchi, stridor


Cardiovascular Exam: Present: regular rate, normal rhythm, normal heart sounds.

  Absent: systolic murmur, diastolic murmur, rubs, gallop, clicks


GI/Abdominal exam: Present: soft, normal bowel sounds.  Absent: distended, 

tenderness, guarding, rebound, rigid


Extremities exam: Present: normal inspection, full ROM, normal capillary 

refill.  Absent: tenderness, pedal edema, joint swelling, calf tenderness


Back exam: Present: normal inspection


Neurological exam: Present: alert, oriented X3, CN II-XII intact


Psychiatric exam: Present: normal affect, normal mood


Skin exam: Present: warm, dry, intact, normal color.  Absent: rash





Course


 Vital Signs











  06/03/17 06/03/17





  06:55 10:13


 


Temperature 98.3 F 


 


Pulse Rate 78 70


 


Respiratory 18 16





Rate  


 


Blood Pressure 116/65 105/61


 


O2 Sat by Pulse 98 94 L





Oximetry  














- Reevaluation(s)


Reevaluation #1: 





06/03/17 10:15


Patient is in no respiratory distress, symptoms are much improved and states he 

would like to be discharged discharged home





Medical Decision Making





- Medical Decision Making





47 male here for evaluation of chest pain, positive pneumonia, patient is in no 

respiratory distress and can be discharged home





- Lab Data


Result diagrams: 


 06/03/17 07:35





 06/03/17 07:35


 Lab Results











  06/03/17 06/03/17 Range/Units





  07:35 07:35 


 


WBC  7.0   (3.8-10.6)  k/uL


 


RBC  5.11   (4.30-5.90)  m/uL


 


Hgb  14.5   (13.0-17.5)  gm/dL


 


Hct  41.7   (39.0-53.0)  %


 


MCV  81.6   (80.0-100.0)  fL


 


MCH  28.5   (25.0-35.0)  pg


 


MCHC  34.9   (31.0-37.0)  g/dL


 


RDW  13.8   (11.5-15.5)  %


 


Plt Count  168   (150-450)  k/uL


 


Neutrophils %  81   %


 


Lymphocytes %  11   %


 


Monocytes %  5   %


 


Eosinophils %  0   %


 


Basophils %  1   %


 


Neutrophils #  5.7   (1.3-7.7)  k/uL


 


Lymphocytes #  0.8 L   (1.0-4.8)  k/uL


 


Monocytes #  0.3   (0-1.0)  k/uL


 


Eosinophils #  0.0   (0-0.7)  k/uL


 


Basophils #  0.1   (0-0.2)  k/uL


 


Sodium   132 L  (137-145)  mmol/L


 


Potassium   3.3 L  (3.5-5.1)  mmol/L


 


Chloride   95 L  ()  mmol/L


 


Carbon Dioxide   25  (22-30)  mmol/L


 


Anion Gap   12  mmol/L


 


BUN   40 H  (9-20)  mg/dL


 


Creatinine   1.42 H  (0.66-1.25)  mg/dL


 


Est GFR (MDRD) Af Amer   >60  (>60 ml/min/1.73 sqM)  


 


Est GFR (MDRD) Non-Af   53  (>60 ml/min/1.73 sqM)  


 


Glucose   96  (74-99)  mg/dL


 


Calcium   8.4  (8.4-10.2)  mg/dL


 


Phosphorus   4.0  (2.5-4.5)  mg/dL


 


Magnesium   2.7 H  (1.6-2.3)  mg/dL


 


Total Bilirubin   0.9  (0.2-1.3)  mg/dL


 


AST   221 H  (17-59)  U/L


 


ALT   63  (21-72)  U/L


 


Alkaline Phosphatase   50  ()  U/L


 


Total Protein   6.7  (6.3-8.2)  g/dL


 


Albumin   3.6  (3.5-5.0)  g/dL


 


Lipase   357 H  ()  U/L














- Radiology Data


Radiology results: report reviewed (Chest x-ray positive for pneumonia), image 

reviewed





Disposition


Clinical Impression: 


 Community acquired bacterial pneumonia





Disposition: HOME SELF-CARE


Condition: Good


Instructions:  Acute Nausea and Vomiting (ED), Community Acquired Pneumonia (ED)


Prescriptions: 


Albuterol Inhaler [Ventolin Hfa Inhaler] 1 - 2 puff INHALATION Q6HR #1 inhaler


Levofloxacin [Levaquin] 750 mg PO DAILY #7 tab


Naproxen [Naprosyn] 500 mg PO Q12HR #30 tab


Ondansetron [Zofran] 4 mg PO Q8HR PRN #30 tab


 PRN Reason: Nausea


Referrals: 


AMAYA Newton III, MD [Primary Care Provider] - 1-2 days

## 2017-07-13 ENCOUNTER — HOSPITAL ENCOUNTER (OUTPATIENT)
Dept: HOSPITAL 47 - OR | Age: 47
Discharge: HOME | End: 2017-07-13
Payer: COMMERCIAL

## 2017-07-13 VITALS — RESPIRATION RATE: 18 BRPM | HEART RATE: 102 BPM | SYSTOLIC BLOOD PRESSURE: 122 MMHG | DIASTOLIC BLOOD PRESSURE: 72 MMHG

## 2017-07-13 VITALS — TEMPERATURE: 97.5 F

## 2017-07-13 VITALS — BODY MASS INDEX: 44.6 KG/M2

## 2017-07-13 DIAGNOSIS — M19.011: ICD-10-CM

## 2017-07-13 DIAGNOSIS — S46.191A: ICD-10-CM

## 2017-07-13 DIAGNOSIS — S43.014A: ICD-10-CM

## 2017-07-13 DIAGNOSIS — S46.011A: Primary | ICD-10-CM

## 2017-07-13 DIAGNOSIS — M66.811: ICD-10-CM

## 2017-07-13 DIAGNOSIS — S43.432A: ICD-10-CM
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
## 2017-07-13 DIAGNOSIS — E66.01: ICD-10-CM

## 2017-07-13 DIAGNOSIS — G47.33: ICD-10-CM

## 2017-07-13 PROCEDURE — 29823 SHO ARTHRS SRG XTNSV DBRDMT: CPT

## 2017-07-13 PROCEDURE — 84132 ASSAY OF SERUM POTASSIUM: CPT

## 2017-07-13 PROCEDURE — 29826 SHO ARTHRS SRG DECOMPRESSION: CPT

## 2017-07-13 PROCEDURE — 64415 NJX AA&/STRD BRCH PLXS IMG: CPT

## 2017-07-13 NOTE — P.ONQ
Anesthesiology Proc Note - PNB





- Peripheral Nerve Block Performed


  ** Right Interscalene Single


Time Out Performed: Yes


Procedure Start Time: 12:40


Procedure Stop Time: 12:45


Indication: Acute Post-Operative Pain, Requested by physician


Sedation Type: Sedate with meaningful contact maintained


Preparation: Sterile Prep


Position: Supine


Needle Size: 50mm (2")


Needle Gauge: 21


Technique: Ultrasound


Injectate: 2.0% Lidocaine (see comment for volume) (ropi .5% plus xylo 2% with 

epi.17cc each.)


Blood Aspirated: No


Pain Paresthesia on Injection Noted: No


Resistance on Injection: Normal


Events: Uneventful and Well Tolerated

## 2017-07-14 NOTE — OP
DATE OF SERVICE:  07/13/2017



SURGEON:  Vamsi Clay MD 

ASSISTANT:  EZEQUIEL Melgar

PREOPERATIVE DIAGNOSES:  

1.  Right shoulder full thickness rotator cuff tear. 

2.  Right shoulder labral tear. 

3.  Right shoulder subacromial impingement. 

POSTOPERATIVE DIAGNOSES: 

1.  Right shoulder massive retracted irreparable rotator cuff tear. 

2.  Right shoulder medial dislocation long head of the biceps tendon with high 
grade partial tearing of the long head of the biceps tendon. 

3.  Right shoulder subacromial impingement. 

PROCEDURE PERFORMED: 

1.  Right shoulder arthroscopic biceps tenotomy. 

2.  Right shoulder anterior superior and posterior labral debridement. 

3.  Right shoulder arthroscopic acromioplasty. 

ANESTHESIA:  General endotracheal. 

ESTIMATED BLOOD LOSS:  Minimal. 

TOURNIQUET: None. 

DRAINS:  None. 

COMPLICATIONS:  None apparent. 



DISPOSITION:  Postanesthesia care unit. 



EXAMINATION UNDER ANESTHESIA:  Right shoulder elevation 160 degrees, external 
rotation at the side was to 30 degrees, external rotation at 90 degrees 
reduction was 90 degrees, internal rotation at 90 degrees reduction was at 60 
degrees, sulcus less than 1 cm, anterior translation to the glenoid rim, 
posterior translation to the glenoid face. 



ARTHROSCOPIC FINDINGS RIGHT SHOULDER: 

1.  Superior labrum:  Type II superior labral tear tearing both anterior and 
posterior to the biceps anchor. The biceps anchor was not intact. He has also 
had high grade partial tearing of the intra-articular portion of the long head 
of the biceps tendon. The long head of the biceps tendon was also medially 
dislocated out of the bicipital groove. 

2.  Anterior inferior labrum: He had a Bankart lesion of the anterior inferior 
labrum, but this was stable. 

3.  Posterior labrum:  Tearing of the posterior labrum from the 9 o'clock to 
the 12 o'clock position. 

4.  Humeral head cartilage was normal. 

5.  Rotator cuff massive full thickness tear with retraction past the glenoid 
of the supraspinatus and the infraspinatus.  There is just a very small wisp of 
infraspinatus still attached posteriorly. There is also partial tearing of the 
superior rolled border of the subscapularis as well. 

6.  Glenoid face cartilage with just mild grade 1 change. 

7.  Subacromial space significant fraying of the undersurface of the 
coracoacromial ligament and he had a type II anterolateral acromial spur. 



INDICATIONS:  Rylan is a pleasant 47-year-old gentleman with right shoulder 
pain.  He sustained an injury to shoulder a few months ago.  He has noted 
significant weakness and pain in the shoulder. He has been through a fairly 
significant course of nonoperative treatment up to this point.  Physical 
examination and MRI was difficult to discern because of significant motion 
artifact, however, he did appear to have a very large tear of the rotator cuff. 
At this point in time, patient feels that he has failed nonoperative management 
and would like to proceed with operative intervention. 



A long discussion was held with the patient with regards to treatment options. 
The risks of procedure were all discussed with him in detail.  These risks 
included but were not limited to risk of infection, nerve damage, bleeding, 
pain and a small risk of deep vein thrombosis, which could lead to fatal 
pulmonary embolism.  Further risks include lack of healing in the rotator cuff 
and a possibility for biceps contour change with a biceps tenotomy. The patient 
understands the operation as well as the fact that there is no guarantee of 
improvement of his symptoms.  An appropriate informed consent was obtained. 



DESCRIPTION OF THE PROCEDURE:  The patient was identified in the preoperative 
holding area. Surgical site was marked by both the patient and myself.  He was 
given 2 grams of Ancef IV for prophylactic purposes. He was then transported to 
the operative suite where he was placed supine on the operating room table.  
The patient was then intubated endotracheally and received general anesthesia 
throughout the operative procedure. Examination under anesthesia was then 
performed and the findings were noted as above. 



The patient was then placed into the beach chair positioned, well padded in 
preparation for surgery.  Great care was taken to ensure that the cervical 
spine was in neutral alignment, well padded and maintained that way throughout 
the operative procedure.  Great care was also taken to ensure that his legs 
were appropriately padded as well. 



The patient's right upper extremity was then prepped and draped in the usual 
sterile fashion.  A standard surgical pause was then undertaken to ensure that 
appropriate preoperative antibiotics have been given and that we were operating 
on the correct site.  All staff in the room were in agreement and we proceeded.
  



The acromion as well as the AC joint, coracoid were marked with surgical pen.  
The skin of the anticipated port sites were also injected with surgical pen. 
The skin of the anticipated portal sites were then injected with 0.25% Marcaine 
with epinephrine.  



I then proceed to make posterior portal, a 30-degree arthroscope was introduced 
in the glenohumeral joint through this portal. The arthroscopic pump pressure 
was then set at 40 mmHg and maintained at that level throughout the entire 
case. 



Next, utilizing the 18 gauge spinal needle (       ) placement, the anterior 
superior portal was made. This was made just underneath the biceps tendon, high 
in the rotator interval.  A small blue 5.75 mm cannula was then place and the 
outflow was then done to (       ) cannula. 



Diagnostic arthroscopy of the shoulder was then performed and the findings were 
noted as above. 



Great care was taken to probe superior labral complex as well as biceps anchor. 
The biceps anchor was not from the attached.  He had a medially dislocated long 
head of the biceps tendon with a high grade partial tear of the intraarticular 
portion of the long head of the biceps tendon. This was draped over the 
superior border of the subscapularis.  The tear in the superior labrum extended 
both anterior and posterior to the biceps anchor.  



At this point, I proceeded with a biceps tenotomy.  This biceps was tenotomized 
nears its attachment onto the supraglenoid tubercle.  This was done utilizing 
the ArthroCare wand. 



I then proceeded to debride the torn loose tissue of the superior labrum. This 
was debrided anteriorly, superiorly and posteriorly back to stable tissue. 



I then inspected the rotator cuff from intraarticular.  He had a massive tear 
of the rotator cuff.  I was unable to see any rotator cuff tissue whatsoever 
with the intraarticular placement of the arthroscope posteriorly. 



At this point, no further work was deemed necessary from intraarticular.  The 
arthroscope was removed from the glenohumeral joint and utilizing (     ) a 
posterior skin incision was placed into the subacromial space.  



Next, utilizing the 18-gauge spinal needle (       ) the placement of lateral 
portal was made under direct visualization.  A subacromial bursectomy was then 
performed utilizing synovial shaver as well as the ArthroCare wand. Due to the 
massive rotator cuff tearing, I left the coracoacromial ligament intact. I then 
proceed on acromioplasty.  He had a type II anterolateral acromial spur.  The 
acromioplasty was done utilizing a synovial shaver in a (       ) type fashion. 
When the acromioplasty was complete, the arthroscope was placed in the lateral 
portal and shaver placed posteriorly and ensured that there was adequate and 
coplanar with posterior aspect of the acromion. 



I then proceed to inspect the rotator cuff further.  I made a fourth portal 
just off the anterolateral angle of the acromion. I utilized this to try to 
grab posterior rotator cuff tissue from this portal.  The arthroscope was 
placed into the lateral portal to inspect the rotator cuff further.  He had a 
very massive tear. The superior humeral head was completely devoid of any 
rotator cuff attachment at all. He had very minimal attachment posteriorly of 
what is likely the most posterior aspect of the infraspinatus or the superior 
aspect of the teres minor. The rotator cuff retraction was medial to the 
glenoid.  What tissue that I could grab of the remaining rotator cuff tissue 
was very poor quality and I could only bring it at best to the mid aspect of 
the humeral head. This was in my opinion at this point this repair was 
irreparable. I was unable to bring any aspect of that tear to the normal 
footprint.  I made a decision at this point not to proceed with any more 
surgical intervention. Many arthroscopic images were taken. The shoulder was 
thoroughly irrigated and then drained with an outflow cannula.  The 
arthroscopic equipment was removed from the shoulder.  



The arthroscopic portals were then closed with 3-0 nylon interrupted suture.  
Sterile compressive dressing was then applied.  The patient's right upper 
extremity was placed in a standard sling.  



All sponge and needle counts were deemed to correct prior to closure.  The 
patient tolerated the procedure without apparent complication.  He was 
transferred to the recovery room in stable condition. 

BATSHEVA

## 2018-07-25 ENCOUNTER — HOSPITAL ENCOUNTER (OUTPATIENT)
Dept: HOSPITAL 47 - RADCTMAIN | Age: 48
Discharge: HOME | End: 2018-07-25
Attending: NURSE PRACTITIONER
Payer: COMMERCIAL

## 2018-07-25 DIAGNOSIS — K40.90: ICD-10-CM

## 2018-07-25 DIAGNOSIS — N20.0: Primary | ICD-10-CM

## 2018-07-25 PROCEDURE — 74176 CT ABD & PELVIS W/O CONTRAST: CPT

## 2018-07-25 NOTE — CT
EXAMINATION TYPE: CT abdomen pelvis wo con

 

DATE OF EXAM: 7/25/2018

 

COMPARISON:

 

HISTORY: Calculus of kidney

 

CT DLP: 1493.00 mGycm

Automated exposure control for dose reduction was used.

 

TECHNIQUE:  Helical acquisition of images was performed from the lung bases through the pelvis.

 

FINDINGS: 

 

LUNG BASES: Subsegmental changes involving the lung suggestive of scar or atelectasis.

 

LIVER/GB: Postcholecystectomy changes are noted. Clip or calcification right liver stable.

 

PANCREAS: No significant abnormality is seen.

 

SPLEEN: No significant abnormality is seen.

 

ADRENALS: No significant abnormality is seen.

 

KIDNEYS: There are indeterminate hypodense lesions within the kidneys noncontrast technique appears s
table relative to the prior

Right kidney: No hydronephrosis. There are 3 less than 5 mm right-sided renal calculi.

 

Left kidney: There is no hydronephrosis. There are 3 approximately 2 mm calcifications. No obstructio
n.

 

ADENOPATHY:  None visualized.

 

OSSEOUS STRUCTURES:  Hypertrophic and degenerative changes of the spine are noted.

 

BOWEL:  Nonspecific pattern with no obstruction. Occasional diverticula seen. No CT evidence of diver
ticulitis..

 

OTHER: Aorta of normal caliber. There is fat-containing inguinal hernias bilaterally. No free fluid. 
No free air.

 

IMPRESSION:

1. Bilateral nonobstructing urolithiasis.

2. Fat-containing inguinal hernias bilaterally

## 2021-04-26 ENCOUNTER — HOSPITAL ENCOUNTER (OUTPATIENT)
Dept: HOSPITAL 47 - RADXRMAIN | Age: 51
Discharge: HOME | End: 2021-04-26
Payer: COMMERCIAL

## 2021-04-26 DIAGNOSIS — M25.441: Primary | ICD-10-CM

## 2021-04-26 NOTE — XR
Right hand

 

HISTORY: Trauma and pain

 

3 views the right hand, correlation to prior exam 5/2/2011

 

There is some underlying arthropathy seen at the metacarpophalangeal joint of the first digit, there 
is remodeling at the radiocarpal joint. The distal aspect of the second digit there is a small metall
ic density present possibly the skin surface, there is also a small focal calcific density just later
al to the distal interphalangeal joint which is indeterminate. There is soft tissue swelling present.
 Alignment is maintained. Bone mineralization is within normal limits. Ossific density dorsal to the 
wrist is well-corticated and not felt likely to be acute.

 

IMPRESSION: No fracture or dislocation. Possible small foreign bodies. Soft tissue swelling.

## 2024-04-25 NOTE — XR
EXAMINATION TYPE: XR abdomen 2V

 

DATE OF EXAM: 1/2/2017 9:31 PM

 

CLINICAL HISTORY: Generalized abdominal and left testicular pain, color changed to scrotum per patien
t.

 

TECHNIQUE: 3 views of abdomen were obtained in supine and upright positions.

 

COMPARISON:  None.

 

FINDINGS:  Scattered gas is seen in non-distended small bowel loops.  Gas and fecal material is seen 
in non-distended colon.  There is no visceromegaly, pneumoperitoneum,   appreciated.  The lung bases 
are clear and the osseous structures are intact.

Small densities are noted superimposing the right kidney largest one measuring 3 mm and is suspicious
 for right kidney stones. Surgical clips are noted in the right upper abdomen with cholecystectomy ch
anges.

 

IMPRESSION:  

1. Overall nonobstructive bowel gas pattern.

2. Right kidney stones are suggested.

3. Cholecystectomy changes.
HLD (hyperlipidemia)

## 2024-09-10 ENCOUNTER — HOSPITAL ENCOUNTER (OUTPATIENT)
Dept: HOSPITAL 47 - RADCTMAIN | Age: 54
End: 2024-09-10
Attending: EMERGENCY MEDICINE
Payer: COMMERCIAL
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
## 2024-09-10 DIAGNOSIS — S13.4XXD: ICD-10-CM

## 2024-09-10 DIAGNOSIS — S01.80XD: Primary | ICD-10-CM

## 2024-09-10 DIAGNOSIS — Z98.1: ICD-10-CM

## 2024-09-10 DIAGNOSIS — Z98.890: ICD-10-CM

## 2024-09-10 DIAGNOSIS — M50.30: ICD-10-CM

## 2024-09-10 PROCEDURE — 70450 CT HEAD/BRAIN W/O DYE: CPT

## 2024-09-10 PROCEDURE — 72125 CT NECK SPINE W/O DYE: CPT

## 2024-09-10 NOTE — CT
EXAMINATION TYPE: CT brain lucas wo con

 

DATE OF EXAM: 9/10/2024

 

COMPARISON: No prior CT brain

 

HISTORY: fall, head and neck pain

 

CT DLP: 1843 mGycm, Automated exposure control for dose reduction was used.

 

CONTRAST: Patient injected with 0 mL of Isovue 300.

 

CT of the brain is performed utilizing 3 mm thick sections through the posterior fossa and 3 mm thick
 sections through the remaining calvarium.  

 

Study is performed within 24 hours of arrival to the hospital.

 

 No abnormal hyperdensity is present to suggest an acute intracranial hemorrhage.

No mass lesion is evident.

No acute infarcts are evident.

Ventricles and sulci are appropriate for the patient age.  

 

Paranasal sinuses and mastoid air cells within the field-of-view are clear. 

 

IMPRESSIONS:

1. No acute intracranial process. Follow-up MRI can be performed as clinically indicated.

 

CT cervical spine.

 

COMPARISON: None

 

CT of the cervical spine is performed in the axial plane at 2 mm thick sections.  Reconstructed image
s in the coronal, and sagittal plane are reviewed on the computer. 

 

No acute fractures are evident.

Vertebral body alignment is normal. There is an anterior cervical fusion C5-6.

There is degenerative disc changes with loss of disc height throughout the cervical spine. There is f
usion through the C5-6 disc level.

Vertebral body heights are preserved.

No spinal canal stenosis is evident.

Uncovertebral joint hypertrophy is contributing to foraminal narrowing on the left at C6-7 

 

IMPRESSION:

1. Postsurgical changes and degenerative disc changes.

2. No acute osseous abnormality evident.